# Patient Record
Sex: MALE | Employment: PART TIME | URBAN - METROPOLITAN AREA
[De-identification: names, ages, dates, MRNs, and addresses within clinical notes are randomized per-mention and may not be internally consistent; named-entity substitution may affect disease eponyms.]

---

## 2018-06-27 ENCOUNTER — HOSPITAL ENCOUNTER (OUTPATIENT)
Age: 23
Setting detail: SPECIMEN
Discharge: HOME | End: 2018-06-27
Payer: COMMERCIAL

## 2018-06-27 LAB
ALBUMIN SERPL-MCNC: 4.6 G/DL (ref 3.5–5.2)
ALBUMIN/GLOBULIN RATIO: 1.5 (ref 1–2.5)
ALP BLD-CCNC: 57 U/L (ref 40–129)
ALT SERPL-CCNC: 16 U/L (ref 5–41)
ANION GAP SERPL CALCULATED.3IONS-SCNC: 10 MMOL/L (ref 9–17)
AST SERPL-CCNC: 22 U/L
BILIRUB SERPL-MCNC: 0.3 MG/DL (ref 0.3–1.2)
BUN BLDV-MCNC: 11 MG/DL (ref 6–20)
BUN/CREAT BLD: NORMAL (ref 9–20)
CALCIUM SERPL-MCNC: 9.4 MG/DL (ref 8.6–10.4)
CHLORIDE BLD-SCNC: 101 MMOL/L (ref 98–107)
CHOLESTEROL: 107 MG/DL
CO2: 26 MMOL/L (ref 20–31)
CREAT SERPL-MCNC: 0.86 MG/DL (ref 0.7–1.2)
GFR AFRICAN AMERICAN: >60 ML/MIN
GFR NON-AFRICAN AMERICAN: >60 ML/MIN
GFR SERPL CREATININE-BSD FRML MDRD: NORMAL ML/MIN/{1.73_M2}
GFR SERPL CREATININE-BSD FRML MDRD: NORMAL ML/MIN/{1.73_M2}
GLUCOSE BLD-MCNC: 71 MG/DL (ref 70–99)
HCT VFR BLD CALC: 43.4 % (ref 40.7–50.3)
HEMOGLOBIN: 13.7 G/DL (ref 13–17)
MCH RBC QN AUTO: 28.4 PG (ref 25.2–33.5)
MCHC RBC AUTO-ENTMCNC: 31.6 G/DL (ref 28.4–34.8)
MCV RBC AUTO: 90 FL (ref 82.6–102.9)
NRBC AUTOMATED: 0 PER 100 WBC
PDW BLD-RTO: 13.2 % (ref 11.8–14.4)
PLATELET # BLD: 298 K/UL (ref 138–453)
PMV BLD AUTO: 9.2 FL (ref 8.1–13.5)
POTASSIUM SERPL-SCNC: 4.1 MMOL/L (ref 3.7–5.3)
RBC # BLD: 4.82 M/UL (ref 4.21–5.77)
SODIUM BLD-SCNC: 137 MMOL/L (ref 135–144)
TOTAL PROTEIN: 7.7 G/DL (ref 6.4–8.3)
TSH SERPL DL<=0.05 MIU/L-ACNC: 1.67 MIU/L (ref 0.3–5)
WBC # BLD: 7.7 K/UL (ref 4.5–13.5)

## 2021-06-21 ENCOUNTER — OFFICE VISIT (OUTPATIENT)
Dept: PODIATRY | Age: 26
End: 2021-06-21
Payer: COMMERCIAL

## 2021-06-21 VITALS — WEIGHT: 160 LBS | BODY MASS INDEX: 22.4 KG/M2 | HEIGHT: 71 IN

## 2021-06-21 DIAGNOSIS — L60.1 ONYCHOLYSIS OF TOENAIL: ICD-10-CM

## 2021-06-21 DIAGNOSIS — S90.212A CONTUSION OF LEFT GREAT TOE WITH DAMAGE TO NAIL, INITIAL ENCOUNTER: Primary | ICD-10-CM

## 2021-06-21 DIAGNOSIS — M79.675 PAIN IN TOE OF LEFT FOOT: ICD-10-CM

## 2021-06-21 PROCEDURE — 11720 DEBRIDE NAIL 1-5: CPT | Performed by: PODIATRIST

## 2021-06-21 PROCEDURE — 99203 OFFICE O/P NEW LOW 30 MIN: CPT | Performed by: PODIATRIST

## 2021-06-21 ASSESSMENT — ENCOUNTER SYMPTOMS
DIARRHEA: 0
BACK PAIN: 0
COLOR CHANGE: 0
SHORTNESS OF BREATH: 0
NAUSEA: 0

## 2021-06-21 NOTE — PROGRESS NOTES
Amira Robin is a 27 y.o. male who presents to the office today with chief complaint of injury to the left great toe. Chief Complaint   Patient presents with    Nail Problem     left hallux, dropped box on it 3/4 of nail fell off    Symptoms began about 3 month(s) ago. Patient complains of injury to the left great toe. Patient states that he dropped a box on the left foot at work. Patient states that this injury damaged the great toenail. Pain is rated 0 out of 10 at it's worst and is described as none. Treatments prior to today's visit include: Patient went to the ED a couple of days afterward and the nail was drilled to drain the bleeding. Patient states that he seemed to heal okay until two weeks ago when 3/4 of the nail fell off. No Known Allergies    No past medical history on file. Prior to Admission medications    Not on File       No past surgical history on file. No family history on file. Social History     Tobacco Use    Smoking status: Former Smoker     Quit date: 2019     Years since quittin.4    Smokeless tobacco: Never Used   Substance Use Topics    Alcohol use: Not on file       Review of Systems   Constitutional: Negative for activity change, appetite change, chills, diaphoresis, fatigue and fever. Respiratory: Negative for shortness of breath. Cardiovascular: Negative for leg swelling. Gastrointestinal: Negative for diarrhea and nausea. Endocrine: Negative for cold intolerance, heat intolerance and polyuria. Musculoskeletal: Negative for arthralgias, back pain, gait problem, joint swelling and myalgias. Skin: Negative for color change, pallor, rash and wound. Allergic/Immunologic: Negative for environmental allergies and food allergies. Neurological: Negative for dizziness, weakness, light-headedness and numbness. Hematological: Does not bruise/bleed easily. Psychiatric/Behavioral: Negative for behavioral problems, confusion and self-injury.  The patient is not nervous/anxious. Vitals: There were no vitals filed for this visit. General: AAO x 3 in NAD. Integument: The left hallux toenail is nearly completely absent. There is a small portion of nail remaining to the medial aspect of the nail. This portion of nail is lytic and easily removed with a nail nipper. There is eschar noted to the nail bed of the left hallux, but no open wound is noted. There is pain with palpation to the nail bed of the left hallux. There is no induration, subcutaneous nodules, or tightening of the skin noted to the bilateral.     Toenails 1-5 of the right foot do not present with thickness, elongation, discoloration, brittleness, subungual debris. Toenails 1-5 of the left foot do not present with thickness, elongation, discoloration, brittleness, subungual debris. Interdigital maceration absent to web spaces 1-4, Bilateral.     There are no preulcerative lesions noted to the right foot. There are no preulcerative lesions noted to the left foot. The skin to the bilateral feet is not thin and shiny. The skin to the bilateral feet is  warm, supple, and dry. Vascular: DP pulse of the right foot is  palpable. DP pulse of the left foot is  palpable. PT pulse of the right foot is  palpable. PT pulse of the left foot is  palpable. CFT is less than 3 secs to the digits of the right foot. CFT is less than 3 secs to the digits of the left foot. There is no edema noted to the bilateral foot or ankle. There is hair growth noted to the digits of the bilateral feet. There are no varicosities noted to the right foot/ankle. There are no varicosities noted to the left foot/ankle. Erythema is absent to the bilateral feet. Neurological: Reflexes are present to the right plantar foot and to the Achilles tendon. Reflexes are present to the left plantar foot and to the Achilles tendon.      Epicritic sensation is

## 2024-10-10 PROBLEM — L25.9 CONTACT DERMATITIS: Status: ACTIVE | Noted: 2024-10-10

## 2025-01-16 ENCOUNTER — HOSPITAL ENCOUNTER (OUTPATIENT)
Age: 30
Discharge: HOME | End: 2025-01-18
Payer: COMMERCIAL

## 2025-01-16 ENCOUNTER — HOSPITAL ENCOUNTER (OUTPATIENT)
Dept: GENERAL RADIOLOGY | Age: 30
Discharge: HOME | End: 2025-01-18
Payer: COMMERCIAL

## 2025-01-16 DIAGNOSIS — M54.50 LUMBAR PAIN: ICD-10-CM

## 2025-01-16 PROCEDURE — 72100 X-RAY EXAM L-S SPINE 2/3 VWS: CPT

## 2025-06-26 ENCOUNTER — OFFICE VISIT (OUTPATIENT)
Age: 30
End: 2025-06-26
Payer: COMMERCIAL

## 2025-06-26 VITALS
HEART RATE: 53 BPM | WEIGHT: 198 LBS | BODY MASS INDEX: 27.72 KG/M2 | DIASTOLIC BLOOD PRESSURE: 72 MMHG | TEMPERATURE: 97.7 F | HEIGHT: 71 IN | SYSTOLIC BLOOD PRESSURE: 116 MMHG | OXYGEN SATURATION: 98 %

## 2025-06-26 DIAGNOSIS — L20.9 ATOPIC DERMATITIS, UNSPECIFIED TYPE: Primary | ICD-10-CM

## 2025-06-26 PROCEDURE — G8427 DOCREV CUR MEDS BY ELIG CLIN: HCPCS | Performed by: DERMATOLOGY

## 2025-06-26 PROCEDURE — 99204 OFFICE O/P NEW MOD 45 MIN: CPT | Performed by: DERMATOLOGY

## 2025-06-26 PROCEDURE — G8419 CALC BMI OUT NRM PARAM NOF/U: HCPCS | Performed by: DERMATOLOGY

## 2025-06-26 PROCEDURE — 1036F TOBACCO NON-USER: CPT | Performed by: DERMATOLOGY

## 2025-06-26 RX ORDER — FLUOCINONIDE 0.5 MG/G
OINTMENT TOPICAL
Qty: 60 G | Refills: 2 | Status: CANCELLED | OUTPATIENT
Start: 2025-06-26

## 2025-06-26 RX ORDER — FLUOCINONIDE 0.5 MG/G
CREAM TOPICAL
Qty: 60 G | Refills: 2 | Status: SHIPPED | OUTPATIENT
Start: 2025-06-26